# Patient Record
Sex: FEMALE | URBAN - METROPOLITAN AREA
[De-identification: names, ages, dates, MRNs, and addresses within clinical notes are randomized per-mention and may not be internally consistent; named-entity substitution may affect disease eponyms.]

---

## 2018-08-13 ENCOUNTER — TELEPHONE (OUTPATIENT)
Dept: CARDIOLOGY CLINIC | Age: 83
End: 2018-08-13

## 2018-08-13 NOTE — TELEPHONE ENCOUNTER
Notified Michelle at [facility] office. Will update medlist when patient comes to office or we confirm change was made.

## 2018-08-20 ASSESSMENT — ENCOUNTER SYMPTOMS
SHORTNESS OF BREATH: 0
COLOR CHANGE: 0
BLOOD IN STOOL: 0
EYE REDNESS: 0
WHEEZING: 0
COUGH: 0
ABDOMINAL PAIN: 0
EYE PAIN: 0
CHEST TIGHTNESS: 0

## 2018-08-20 NOTE — PROGRESS NOTES
normal.  Exam reveals no gallop. No murmur heard. Cath site stable   Pulmonary/Chest: Effort normal and breath sounds normal.   Abdominal: Soft. Bowel sounds are normal.   Musculoskeletal: Normal range of motion. Thoracic tenderness   Neurological: She is alert and oriented to person, place, and time. Skin: Skin is warm and dry. Psychiatric: She has a normal mood and affect. Her behavior is normal.   Nursing note and vitals reviewed. Wt Readings from Last 3 Encounters:   18 145 lb (65.8 kg)     BP Readings from Last 3 Encounters:   18 120/64     Pulse Readings from Last 3 Encounters:   18 64         Current Outpatient Prescriptions:     Cholecalciferol (VITAMIN D3) 2000 units CAPS, Take by mouth daily, Disp: , Rfl:     acetaminophen (TYLENOL) 500 MG tablet, Take 1,000 mg by mouth 3 times daily, Disp: , Rfl:     diphenoxylate-atropine (LOMOTIL) 2.5-0.025 MG per tablet, Take 1 tablet by mouth 4 times daily as needed for Diarrhea. ., Disp: , Rfl:     gabapentin (NEURONTIN) 300 MG capsule, Take 300 mg by mouth 3 times daily. ., Disp: , Rfl:     metoprolol succinate (TOPROL XL) 25 MG extended release tablet, Take 25 mg by mouth daily, Disp: , Rfl:     aspirin 81 MG tablet, Take 81 mg by mouth daily, Disp: , Rfl:     isosorbide mononitrate (IMDUR) 30 MG extended release tablet, Take 30 mg by mouth daily, Disp: , Rfl:     Phenazopyridine HCl (AZO DINE MAXIMUM STRENGTH PO), Take by mouth, Disp: , Rfl:     clopidogrel (PLAVIX) 75 MG tablet, Take 75 mg by mouth daily, Disp: , Rfl:     atorvastatin (LIPITOR) 40 MG tablet, Take 1 tablet by mouth daily, Disp: 30 tablet, Rfl: 6    Past Medical History:   Diagnosis Date    SOFYA [facility]acute kidney [facility]) ([facility]     Cr 1.6 2018. Repeat Cr normal.     CAD (coronary artery disease)     S/P NSTEMI 2018. Echo 2018 LVEF normal, mild MR, TR, mod pulmonary HTN.  Cath 2018  60% mid LAD, LVEF normal.     CVA (cerebral vascular accident) ([facility] Hx    [facility] ([facility]     HLD (hyperlipidemia)     SVT (supraventricular tachycardia) ([facility]        Social History     Social History    Marital status:      Spouse name: N/A    Number of children: N/A    Years of education: N/A     Social History Main Topics    Smoking status: Never Smoker    Smokeless tobacco: Never Used    Alcohol use No    Drug use: No    Sexual activity: Not Asked     Other Topics Concern    None     Social History Narrative    None       Past Surgical History:   Procedure Laterality Date    BLADDER SURGERY      BREAST BIOPSY      CYSTOSCOPY      DIAGNOSTIC CARDIAC CATH LAB PROCEDURE      HEMORRHOID SURGERY      HYSTERECTOMY      LEG SURGERY      SHOULDER SURGERY         Family History   Problem Relation Age of Onset    Diabetes Mother     Heart Surgery Father 70       Allergies   Allergen Reactions    Lisinopril      cough    Sulfa Antibiotics        Recent Labs and Imaging reviewed    Assessment     SOFYA (acute kidney injury) ([facility] [address] Cr 1.6 2018. Repeat Cr normal.     CAD (coronary artery disease)     S/P NSTEMI 2018. Echo 2018 LVEF normal, mild MR, TR, mod pulmonary HTN. Cath 2018  60% mid LAD, LVEF normal.     CVA (cerebral vascular accident) ([facility] [facility]     Hx    Diabetes mellitus ([facility] [facility]- managed by PCP.  HLD (hyperlipidemia)- not taking statin.  SVT (supraventricular tachycardia) (HCC)        Plan     No evidence of CHF. Continue medical management of CAD. Continue ASA, BB, Imdur and plavix. Will start atorvastatin 40mg nightly. Risk factor modification also discussed. Fasting lipid profile, CMP prior to next visit. Return in about 6 months (around 2019). This note was scribed in the presence of Dr. Eleanor Silvestre MD by Antonio Egan RN. The scribes documentation has been prepared under my direction and personally reviewed by me in its entirety.     I confirm that the note above accurately reflects all work,

## 2018-08-21 ENCOUNTER — OFFICE VISIT (OUTPATIENT)
Dept: CARDIOLOGY CLINIC | Age: 83
End: 2018-08-21

## 2018-08-21 VITALS
HEART RATE: 64 BPM | SYSTOLIC BLOOD PRESSURE: 120 MMHG | OXYGEN SATURATION: 95 % | BODY MASS INDEX: 26.68 KG/M2 | HEIGHT: 62 IN | WEIGHT: 145 LBS | DIASTOLIC BLOOD PRESSURE: 64 MMHG

## 2018-08-21 DIAGNOSIS — E78.00 PURE HYPERCHOLESTEROLEMIA: ICD-10-CM

## 2018-08-21 DIAGNOSIS — I25.10 CORONARY ARTERY DISEASE INVOLVING NATIVE CORONARY ARTERY OF NATIVE HEART WITHOUT ANGINA PECTORIS: Primary | ICD-10-CM

## 2018-08-21 DIAGNOSIS — I47.1 SVT (SUPRAVENTRICULAR TACHYCARDIA) (HCC): ICD-10-CM

## 2018-08-21 PROCEDURE — G8400 PT W/DXA NO RESULTS DOC: HCPCS | Performed by: INTERNAL MEDICINE

## 2018-08-21 PROCEDURE — G8427 DOCREV CUR MEDS BY ELIG CLIN: HCPCS | Performed by: INTERNAL MEDICINE

## 2018-08-21 PROCEDURE — G8598 ASA/ANTIPLAT THER USED: HCPCS | Performed by: INTERNAL MEDICINE

## 2018-08-21 PROCEDURE — 1101F PT FALLS ASSESS-DOCD LE1/YR: CPT | Performed by: INTERNAL MEDICINE

## 2018-08-21 PROCEDURE — G8419 CALC BMI OUT NRM PARAM NOF/U: HCPCS | Performed by: INTERNAL MEDICINE

## 2018-08-21 PROCEDURE — 1036F TOBACCO NON-USER: CPT | Performed by: INTERNAL MEDICINE

## 2018-08-21 PROCEDURE — 1123F ACP DISCUSS/DSCN MKR DOCD: CPT | Performed by: INTERNAL MEDICINE

## 2018-08-21 PROCEDURE — 1090F PRES/ABSN URINE INCON ASSESS: CPT | Performed by: INTERNAL MEDICINE

## 2018-08-21 PROCEDURE — 4040F PNEUMOC VAC/ADMIN/RCVD: CPT | Performed by: INTERNAL MEDICINE

## 2018-08-21 PROCEDURE — 99214 OFFICE O/P EST MOD 30 MIN: CPT | Performed by: INTERNAL MEDICINE

## 2018-08-21 RX ORDER — DIPHENOXYLATE HYDROCHLORIDE AND ATROPINE SULFATE 2.5; .025 MG/1; MG/1
1 TABLET ORAL 4 TIMES DAILY PRN
COMMUNITY

## 2018-08-21 RX ORDER — ATORVASTATIN CALCIUM 40 MG/1
40 TABLET, FILM COATED ORAL DAILY
Qty: 30 TABLET | Refills: 6 | Status: SHIPPED | OUTPATIENT
Start: 2018-08-21 | End: 2020-07-01

## 2018-08-21 RX ORDER — ISOSORBIDE MONONITRATE 30 MG/1
30 TABLET, EXTENDED RELEASE ORAL DAILY
COMMUNITY
End: 2020-07-01

## 2018-08-21 RX ORDER — GABAPENTIN 300 MG/1
300 CAPSULE ORAL 3 TIMES DAILY
COMMUNITY

## 2018-08-21 RX ORDER — CLOPIDOGREL BISULFATE 75 MG/1
75 TABLET ORAL DAILY
COMMUNITY

## 2018-08-21 RX ORDER — METOPROLOL SUCCINATE 25 MG/1
25 TABLET, EXTENDED RELEASE ORAL DAILY
COMMUNITY

## 2018-08-21 RX ORDER — ACETAMINOPHEN 160 MG
TABLET,DISINTEGRATING ORAL DAILY
COMMUNITY

## 2018-08-21 RX ORDER — ACETAMINOPHEN 500 MG
1000 TABLET ORAL 3 TIMES DAILY
COMMUNITY

## 2018-08-21 ASSESSMENT — ENCOUNTER SYMPTOMS: BACK PAIN: 1

## 2018-08-21 NOTE — LETTER
procedures, and medical decision making performed by me. If you have questions, please do not hesitate to call me. I look forward to following [place] along with you.     Sincerely,        Sondra Clark MD

## 2018-08-21 NOTE — PATIENT INSTRUCTIONS
Patient Education        Learning About Coronary Artery Disease (CAD)  What is coronary artery disease? Coronary artery disease (CAD) occurs when plaque builds up in the arteries that bring oxygen-rich blood to your heart. Plaque is a fatty substance made of cholesterol, calcium, and other substances in the blood. This process is called hardening of the arteries, or atherosclerosis. What happens when you have coronary artery disease? · Plaque may narrow the coronary arteries. Narrowed arteries cause poor blood flow. This can lead to angina symptoms such as chest pain or discomfort. If blood flow is completely blocked, you could have a heart attack. · You can slow CAD and reduce the risk of future problems by making changes in your lifestyle. These include quitting smoking and eating heart-healthy foods. · Treatments for CAD, along with changes in your lifestyle, can help you live a longer and healthier life. How can you prevent coronary artery disease? · Do not smoke. It may be the best thing you can do to prevent heart disease. If you need help quitting, talk to your doctor about stop-smoking programs and medicines. These can increase your chances of quitting for good. · Be active. Get at least 30 minutes of exercise on most days of the week. Walking is a good choice. You also may want to do other activities, such as running, swimming, cycling, or playing tennis or team sports. · Eat heart-healthy foods. Eat more fruits and vegetables and less foods that contain saturated and trans fats. Limit alcohol, sodium, and sweets. · Stay at a healthy weight. Lose weight if you need to. · Manage other health problems such as diabetes, high blood pressure, and high cholesterol. · Manage stress. Stress can hurt your heart. To keep stress low, talk about your problems and feelings. Don't keep your feelings hidden. · If you have talked about it with your doctor, take a low-dose aspirin every day.  Aspirin can help certain people lower their risk of a heart attack or stroke. But taking aspirin isn't right for everyone, because it can cause serious bleeding. Do not start taking daily aspirin unless your doctor knows about it. How is coronary artery disease treated? · Your doctor will suggest that you make lifestyle changes. For example, your doctor may ask you to eat healthy foods, quit smoking, lose extra weight, and be more active. · You will have to take medicines. · Your doctor may suggest a procedure to open narrowed or blocked arteries. This is called angioplasty. Or your doctor may suggest using healthy blood vessels to create detours around narrowed or blocked arteries. This is called bypass surgery. Follow-up care is a key part of your treatment and safety. Be sure to make and go to all appointments, and call your doctor if you are having problems. It's also a good idea to know your test results and keep a list of the medicines you take. Where can you learn more? Go to https://Act[employer]. org and sign in to your [employer] account. Enter [phone] in the Sensitive Object box to learn more about \"Learning About Coronary Artery Disease (CAD). \"     If you do not have an account, please click on the \"Sign Up Now\" link. Current as of: December 6, 2017  Content Version: 11.7  © 4470-6089 [employer]. Care instructions adapted under license by [facility] [address] If you have questions about a medical condition or this instruction, always ask your healthcare professional. [place] [address] any warranty or liability for your use of this information. START ATORVASTATIN 40MG DAILY.      Patient Education

## 2020-06-29 ASSESSMENT — ENCOUNTER SYMPTOMS
ABDOMINAL PAIN: 0
BACK PAIN: 1
SHORTNESS OF BREATH: 0
WHEEZING: 0
EYE PAIN: 0
COUGH: 0
EYE REDNESS: 0
CHEST TIGHTNESS: 0
BLOOD IN STOOL: 0
COLOR CHANGE: 0

## 2020-06-29 NOTE — PROGRESS NOTES
Adriel Puri is a 80 y.o. female    Reason for Visit: F/u CAD, Chest pain                       HPI Adriel Puri was last seen in the office in 2018. Today she is here for evaluation of chest pain described as \"twinges. \" The twinges improve with rest. The pains move about her left thorax. She also has some mid-sternal pressure at times. Her chest pain occurs randomly and mostly in the evenings while resting. The pain can last up to 4-5 minutes with radiation to her posterior head and jaw. She has chronic joint and back pain. Today she denies exertional chest pain, palpitations, dizziness, syncope, worsening leg swelling and worsening dyspnea. She is here with her son and is wheelchair bound most of the time. Her son states that she has a lot of anxiety. Review of Systems   Constitutional: Negative for activity change, appetite change, diaphoresis and fatigue. HENT: Negative for nosebleeds and tinnitus. Eyes: Negative for pain and redness. Respiratory: Negative for cough, chest tightness, shortness of breath and wheezing. Cardiovascular: Positive for chest pain. Negative for palpitations and leg swelling. Gastrointestinal: Negative for abdominal pain and blood in stool. Genitourinary: Negative for difficulty urinating, hematuria, pelvic pain and vaginal bleeding. Musculoskeletal: Positive for arthralgias, back pain, joint swelling and myalgias. Negative for neck pain. Skin: Negative for color change and pallor. Neurological: Negative for dizziness, syncope, numbness and headaches. Hematological: Does not bruise/bleed easily. Psychiatric/Behavioral: Negative for confusion and decreased concentration. All other systems reviewed and are negative. Physical Exam   Constitutional: She is oriented to person, place, and time. She appears well-developed and well-nourished. Elderly, using wheelchair. HENT:   Head: Normocephalic and atraumatic.    Eyes: Conjunctivae and EOM are succinate (TOPROL XL) 25 MG extended release tablet, Take 25 mg by mouth daily, Disp: , Rfl:     aspirin 81 MG tablet, Take 81 mg by mouth daily, Disp: , Rfl:     Phenazopyridine HCl (AZO DINE MAXIMUM STRENGTH PO), Take by mouth, Disp: , Rfl:     clopidogrel (PLAVIX) 75 MG tablet, Take 75 mg by mouth daily, Disp: , Rfl:     Past Medical History:   Diagnosis Date    SO[facility]YA (acute kidney injury) ([facility] [address]     Cr 1.6 2018. Repeat Cr normal.     CAD (coronary artery disease)     S/P NSTEMI 2018. Echo 2018 LVEF normal, mild MR, TR, mod pulmonary HTN.  Cath 2018  60% mid LAD, LVEF normal.     [facility]     Hx    Diabetes mellitus ([facility]     HLD (hyperlipidemia)     [facility]        Social History     Socioeconomic History    Marital status:      Spouse name: None    Number of children: None    Years of education: None    Highest education level: None   Occupational History    None   Social Needs    Financial resource strain: None    Food insecurity     Worry: None     Inability: None    Transportation needs     Medical: None     Non-medical: None   Tobacco Use    Smoking status: Never Smoker    Smokeless tobacco: Never Used   Substance and Sexual Activity    Alcohol use: No    Drug use: No    Sexual activity: None   Lifestyle    Physical activity     Days per week: None     Minutes per session: None    Stress: None   Relationships    Social connections     Talks on phone: None     Gets together: None     Attends Spiritism service: None     Active member of club or organization: None     Attends meetings of clubs or organizations: None     Relationship status: None    Intimate partner violence     Fear of current or ex partner: None     Emotionally abused: None     Physically abused: None     Forced sexual activity: None   Other Topics Concern    None   Social History Narrative    None       Past Surgical History:   Procedure Laterality Date    BLADDER SURGERY      BREAST BIOPSY      CYSTOSCOPY      DIAGNOSTIC CARDIAC CATH LAB PROCEDURE      HEMORRHOID SURGERY      HYSTERECTOMY      KIDNEY REMOVAL Left     LEG SURGERY      SHOULDER SURGERY         Family History   Problem Relation Age of Onset    Diabetes Mother     Heart Surgery Father 70       Allergies   Allergen Reactions    Lisinopril      cough    Sulfa Antibiotics        Recent Labs and Imaging reviewed    Assessment     CAD (coronary artery disease) with stable angina. S/P NSTEMI 2018. Echo 2018 LVEF normal, mild MR, TR, mod pulmonary HTN. Cath 2018  60% mid LAD, LVEF normal.       CVA (cerebral vascular accident) ([facility] [address]     Hx      Diabetes mellitus ([facility]- managed by PCP.  HLD (hyperlipidemia)- LDL 43 2020       SVT (supraventricular tachycardia) ([facility] [address] [facility] Hx        *  Chest pain. EKG 20 NSR, PRWP, possible anteroseptal MI. Could be angina. * Renal cancer. S/p nephrectomy . Cr 2.7 2020       * Leg edema. Secondary to venous insufficiency. Plan     No evidence of CHF. Stable angina. Treatment options for CAD discussed including med therapy, stress testing and coronary angiogram. Pt and son agree to continue medical management of CAD with ASA, statin, BB. Will add Imdur 30mg daily for improved angina control. Return in about 6 months (around 2021). This note was scribed in the presence of the physician by Ki Ojeda RN. The scribes documentation has been prepared under my direction and personally reviewed by me in its entirety. I confirm that the note above accurately reflects all work, treatment, procedures, and medical decision making performed by me.

## 2020-07-01 ENCOUNTER — OFFICE VISIT (OUTPATIENT)
Dept: CARDIOLOGY CLINIC | Age: 85
End: 2020-07-01
Payer: COMMERCIAL

## 2020-07-01 VITALS
DIASTOLIC BLOOD PRESSURE: 78 MMHG | HEIGHT: 62 IN | TEMPERATURE: 98.4 F | SYSTOLIC BLOOD PRESSURE: 122 MMHG | BODY MASS INDEX: 26.52 KG/M2 | HEART RATE: 72 BPM | OXYGEN SATURATION: 96 %

## 2020-07-01 PROCEDURE — 93000 ELECTROCARDIOGRAM COMPLETE: CPT | Performed by: INTERNAL MEDICINE

## 2020-07-01 PROCEDURE — 99215 OFFICE O/P EST HI 40 MIN: CPT | Performed by: INTERNAL MEDICINE

## 2020-07-01 RX ORDER — FUROSEMIDE 40 MG/1
40 TABLET ORAL 2 TIMES DAILY
COMMUNITY
End: 2020-12-04 | Stop reason: ALTCHOICE

## 2020-07-01 RX ORDER — INSULIN GLARGINE 100 [IU]/ML
INJECTION, SOLUTION SUBCUTANEOUS NIGHTLY
COMMUNITY

## 2020-07-01 RX ORDER — PANTOPRAZOLE SODIUM 40 MG/1
40 GRANULE, DELAYED RELEASE ORAL
COMMUNITY

## 2020-07-01 RX ORDER — ROSUVASTATIN CALCIUM 20 MG/1
20 TABLET, COATED ORAL DAILY
COMMUNITY

## 2020-07-01 RX ORDER — ISOSORBIDE MONONITRATE 30 MG/1
30 TABLET, EXTENDED RELEASE ORAL DAILY
Qty: 90 TABLET | Refills: 3 | Status: SHIPPED | OUTPATIENT
Start: 2020-07-01

## 2020-07-01 RX ORDER — CEFACLOR 250 MG
250 CAPSULE ORAL DAILY
COMMUNITY

## 2020-07-01 NOTE — LETTER
[address] Cardiology - [facility]  [address]  Phone: [phone]  Fax: [phone]    Barry Mills MD        July 13, 2020     Steffi Alvarez MD  [facility], [address]  [address] [address]    Patient: Diana Wan  MR Number: <[id number]>  YOB: 1934  Date of Visit: 7/1/2020    Dear Dr. Steffi Alvarez: Thank you for your referral. Progress note attached in visit summary. If you have questions, please do not hesitate to call me. I look forward to following Hilaria Kvng along with you.     Sincerely,        Barry Mills MD

## 2020-08-24 ENCOUNTER — TELEPHONE (OUTPATIENT)
Dept: CARDIOLOGY CLINIC | Age: 85
End: 2020-08-24

## 2020-08-24 NOTE — TELEPHONE ENCOUNTER
Per Dr. Selvin Egan:  Recommend lexiscan myoview stress test to evaluate for ischemia prior to having surgery. Diagnose: angina, CAD, preop risk assessment.

## 2020-08-24 NOTE — TELEPHONE ENCOUNTER
Pt notified and will send to Lenora for precert.     Pt wanted us to know that we can give Albino any information he wants

## 2020-08-24 NOTE — TELEPHONE ENCOUNTER
Assessment     CAD (coronary artery disease) with stable angina. S/P NSTEMI 2018. Echo 2018 LVEF normal, mild MR, TR, mod pulmonary HTN. Cath 2018  60% mid LAD, LVEF normal.       CVA (cerebral vascular accident) ([facility]     Hx      Diabetes mellitus ([facility]- managed by PCP.  HLD (hyperlipidemia)- LDL 43 2020       SVT (supraventricular tachycardia) ([facility] Hx        *  Chest pain. EKG 20 NSR, PRWP, possible anteroseptal MI. Could be angina. * Renal cancer. S/p nephrectomy . Cr 2.7 2020       * Leg edema. Secondary to venous insufficiency. Plan     No evidence of CHF. Stable angina. Treatment options for CAD discussed including med therapy, stress testing and coronary angiogram. Pt and son agree to continue medical management of CAD with ASA, statin, BB. Will add Imdur 30mg daily for improved angina control. Return in about 6 months (around 2021).

## 2020-08-24 NOTE — TELEPHONE ENCOUNTER
Called pts home number, vm not set up. Called Albino, advised him that pt has no one listed on her hippa. He will call pt and have her call us. Order stress test placed.

## 2020-08-25 NOTE — TELEPHONE ENCOUNTER
pts son Ochoa Chávez states pt is in a wheelchair and he is not sure that pt can do a stress test since she can't hardly walk or stand.

## 2020-09-08 ENCOUNTER — TELEPHONE (OUTPATIENT)
Dept: CARDIOLOGY CLINIC | Age: 85
End: 2020-09-08

## 2020-09-08 NOTE — TELEPHONE ENCOUNTER
Stress test normal, showing no ischemia, fixed or reversible defects. Please call Aure Romano with the normal stress test results and verify if she still needs a preoperative risk assessment completed. Appears the stress test was ordered for the pre-op request prior to a mastectomy and will need to know if the ASA and Plavix need to be held, if so how long.

## 2020-09-08 NOTE — LETTER
[facility] - [address] [place] [address] [address]  [address] [address]  Phone: [phone]  Fax: [phone]    Sergio Mack MD        September 9, 2020     Patient: Johnathan Talamantes   YOB: 1934   Date of Visit: 9/8/2020       To Whom It May Concern: It is my medical opinion that Klaudia Briceño to hold ASA,Plavix for 7 days. .    If you have any questions or concerns, please don't hesitate to call.     Sincerely,        Sergio Mack MD

## 2020-09-09 NOTE — TELEPHONE ENCOUNTER
Dr. Gamaliel nAgulo please advise in Dr. Kirill Syr absence. Patient requested pre-operative risk assessment for a Mastectomy with Dr. Keiry Yanez. Requesting to hold ASA and Plavix for 7 days prior. Dr. Brendan Padron ordered a stress test prior to giving the pre-op which showed no ischemia.

## 2020-12-02 ENCOUNTER — TELEPHONE (OUTPATIENT)
Dept: CARDIOLOGY CLINIC | Age: 85
End: 2020-12-02

## 2020-12-02 NOTE — LETTER
[facility] - [address] [address] [address]  [address] [address]  Phone: [phone]  Fax: [phone]    Thais Owens MD        December 3, 2020     Patient: Hedy Coy   YOB: 1934   Date of Visit: 12/2/2020       To Whom It May Concern: It is my medical opinion that Erin Hidalgo may stop ASA 5-7 days if needed and plavix 7 days prior to procedure and resume ASAP postoperatively. .    If you have any questions or concerns, please don't hesitate to call.     Sincerely,        Thais Owens MD

## 2020-12-02 NOTE — TELEPHONE ENCOUNTER
Per Dr. Leann Farrell:  From cardiac standpoint, may stop ASA 5-7 days if needed and plavix 7 days prior to procedure and resume ASAP postoperatively.

## 2020-12-02 NOTE — TELEPHONE ENCOUNTER
Assessment     CAD (coronary artery disease) with stable angina. S/P NSTEMI 2018. Echo 2018 LVEF normal, mild MR, TR, mod pulmonary HTN. Cath 2018  60% mid LAD, LVEF normal.       CVA (cerebral vascular accident) ([facility] [address] [facility]     Hx      Diabetes mellitus ([facility] [facility]- managed by PCP.  HLD (hyperlipidemia)- LDL 43 2020       SVT (supraventricular tachycardia) ([facility] [address] [facility] Hx        *  Chest pain. EKG 20 NSR, PRWP, possible anteroseptal MI. Could be angina. * Renal cancer. S/p nephrectomy . Cr 2.7 2020       * Leg edema. Secondary to venous insufficiency. Plan     No evidence of CHF. Stable angina. Treatment options for CAD discussed including med therapy, stress testing and coronary angiogram. Pt and son agree to continue medical management of CAD with ASA, statin, BB. Will add Imdur 30mg daily for improved angina control. Return in about 6 months (around 2021). This note was scribed in the presence of the physician by Kristine Gtz RN.

## 2020-12-02 NOTE — TELEPHONE ENCOUNTER
Patient is going to have a jaclyn cath inserted in left arm by Dr. Michael Weaver and he wants the recommendation on holding and  resuming her Plavix?  Please fax letter to Dr. Hubert Sellers at [phone]

## 2021-02-01 ENCOUNTER — TELEPHONE (OUTPATIENT)
Dept: NEPHROLOGY | Age: 86
End: 2021-02-01